# Patient Record
Sex: MALE | Race: WHITE | NOT HISPANIC OR LATINO
[De-identification: names, ages, dates, MRNs, and addresses within clinical notes are randomized per-mention and may not be internally consistent; named-entity substitution may affect disease eponyms.]

---

## 2023-01-09 ENCOUNTER — APPOINTMENT (OUTPATIENT)
Dept: ORTHOPEDIC SURGERY | Facility: CLINIC | Age: 23
End: 2023-01-09
Payer: COMMERCIAL

## 2023-01-09 VITALS — WEIGHT: 155 LBS | BODY MASS INDEX: 23.49 KG/M2 | HEIGHT: 68 IN

## 2023-01-09 DIAGNOSIS — Z78.9 OTHER SPECIFIED HEALTH STATUS: ICD-10-CM

## 2023-01-09 DIAGNOSIS — S33.5XXA SPRAIN OF LIGAMENTS OF LUMBAR SPINE, INITIAL ENCOUNTER: ICD-10-CM

## 2023-01-09 DIAGNOSIS — M53.3 SACROCOCCYGEAL DISORDERS, NOT ELSEWHERE CLASSIFIED: ICD-10-CM

## 2023-01-09 DIAGNOSIS — F17.200 NICOTINE DEPENDENCE, UNSPECIFIED, UNCOMPLICATED: ICD-10-CM

## 2023-01-09 PROBLEM — Z00.00 ENCOUNTER FOR PREVENTIVE HEALTH EXAMINATION: Status: ACTIVE | Noted: 2023-01-09

## 2023-01-09 PROCEDURE — 99244 OFF/OP CNSLTJ NEW/EST MOD 40: CPT

## 2023-01-09 PROCEDURE — 99072 ADDL SUPL MATRL&STAF TM PHE: CPT

## 2023-01-09 PROCEDURE — 72100 X-RAY EXAM L-S SPINE 2/3 VWS: CPT

## 2023-01-09 PROCEDURE — 72200 X-RAY EXAM SI JOINTS: CPT

## 2023-01-09 NOTE — ASSESSMENT
[FreeTextEntry1] : Patient given prescription for MRI, follow up after study is completed to discuss results. \par \par Recommend: - NSAID - Heating pad - Muscle relaxer - Core strengthening exercise - Hamstring stretching exercise Patient is given back rehabilitation exercise book. \par \par Recommend sitting donut
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
## 2023-01-13 ENCOUNTER — FORM ENCOUNTER (OUTPATIENT)
Age: 23
End: 2023-01-13

## 2023-01-14 ENCOUNTER — FORM ENCOUNTER (OUTPATIENT)
Age: 23
End: 2023-01-14

## 2025-08-19 ENCOUNTER — OFFICE (OUTPATIENT)
Dept: URBAN - METROPOLITAN AREA CLINIC 8 | Facility: CLINIC | Age: 25
Setting detail: OPHTHALMOLOGY
End: 2025-08-19
Payer: COMMERCIAL

## 2025-08-19 DIAGNOSIS — H01.004: ICD-10-CM

## 2025-08-19 DIAGNOSIS — H01.001: ICD-10-CM

## 2025-08-19 DIAGNOSIS — H52.13: ICD-10-CM

## 2025-08-19 PROBLEM — H52.7 REFRACTIVE ERROR: Status: ACTIVE | Noted: 2025-08-19

## 2025-08-19 PROCEDURE — 92004 COMPRE OPH EXAM NEW PT 1/>: CPT

## 2025-08-19 PROCEDURE — 92015 DETERMINE REFRACTIVE STATE: CPT

## 2025-08-19 ASSESSMENT — KERATOMETRY
OD_K2POWER_DIOPTERS: 44.50
OS_K1POWER_DIOPTERS: 43.50
OS_K2POWER_DIOPTERS: 44.75
OS_AXISANGLE_DEGREES: 082
OD_AXISANGLE_DEGREES: 102
OD_K1POWER_DIOPTERS: 43.25

## 2025-08-19 ASSESSMENT — REFRACTION_AUTOREFRACTION
OD_SPHERE: -1.25
OS_CYLINDER: -0.25
OD_SPHERE: -1.25
OS_SPHERE: -1.50
OS_CYLINDER: --0.25
OD_CYLINDER: -0.50
OS_SPHERE: -1.50
OD_CYLINDER: -0.50
OS_AXIS: 158
OD_AXIS: 060
OS_AXIS: 157
OD_AXIS: 054

## 2025-08-19 ASSESSMENT — REFRACTION_MANIFEST
OS_SPHERE: -1.50
OD_SPHERE: -1.75
OS_CYLINDER: -0.25
OU_VA: 20/15
OD_CYLINDER: -0.75
OS_VA1: 20/20
OU_VA: 20/15
OD_AXIS: 060
OD_VA1: 20/20
OS_AXIS: 160
OD_VA1: 20/15
OS_VA1: 20/15
OD_SPHERE: -1.25
OD_CYLINDER: -0.50
OD_AXIS: 060
OS_SPHERE: -1.50
OS_CYLINDER: SPH

## 2025-08-19 ASSESSMENT — LID EXAM ASSESSMENTS
OD_BLEPHARITIS: T
OS_BLEPHARITIS: T

## 2025-08-19 ASSESSMENT — VISUAL ACUITY
OS_BCVA: 20/40
OD_BCVA: 20/40-

## 2025-08-19 ASSESSMENT — TONOMETRY
OS_IOP_MMHG: 16
OD_IOP_MMHG: 16

## 2025-08-19 ASSESSMENT — CONFRONTATIONAL VISUAL FIELD TEST (CVF)
OD_FINDINGS: FULL
OS_FINDINGS: FULL